# Patient Record
Sex: MALE | Race: WHITE | Employment: FULL TIME | URBAN - METROPOLITAN AREA
[De-identification: names, ages, dates, MRNs, and addresses within clinical notes are randomized per-mention and may not be internally consistent; named-entity substitution may affect disease eponyms.]

---

## 2017-05-25 ENCOUNTER — HOSPITAL ENCOUNTER (OUTPATIENT)
Dept: GENERAL RADIOLOGY | Facility: MEDICAL CENTER | Age: 47
Discharge: HOME | End: 2017-05-25
Attending: ORTHOPAEDIC SURGERY | Admitting: ORTHOPAEDIC SURGERY

## 2017-05-25 ENCOUNTER — OFFICE VISIT (OUTPATIENT)
Dept: ORTHOPEDIC SURGERY | Age: 47
End: 2017-05-25

## 2017-05-25 VITALS
SYSTOLIC BLOOD PRESSURE: 130 MMHG | HEIGHT: 71 IN | DIASTOLIC BLOOD PRESSURE: 87 MMHG | WEIGHT: 280 LBS | HEART RATE: 79 BPM | BODY MASS INDEX: 39.2 KG/M2

## 2017-05-25 DIAGNOSIS — M17.12 PRIMARY OSTEOARTHRITIS OF LEFT KNEE: ICD-10-CM

## 2017-05-25 DIAGNOSIS — M22.42 CHONDROMALACIA PATELLAE OF LEFT KNEE: ICD-10-CM

## 2017-05-25 DIAGNOSIS — S83.512S RUPTURE OF ANTERIOR CRUCIATE LIGAMENT OF LEFT KNEE, SEQUELA: ICD-10-CM

## 2017-05-25 DIAGNOSIS — M25.562 ACUTE PAIN OF LEFT KNEE: Primary | ICD-10-CM

## 2017-05-25 DIAGNOSIS — R20.0 NUMBNESS AND TINGLING OF LEFT LEG: ICD-10-CM

## 2017-05-25 DIAGNOSIS — S83.412A GRADE 1 INJURY OF MEDIAL COLLATERAL LIGAMENT OF LEFT KNEE: ICD-10-CM

## 2017-05-25 DIAGNOSIS — M25.562 ACUTE PAIN OF LEFT KNEE: ICD-10-CM

## 2017-05-25 DIAGNOSIS — R20.2 NUMBNESS AND TINGLING OF LEFT LEG: ICD-10-CM

## 2017-05-25 DIAGNOSIS — Z98.890 S/P LEFT KNEE ARTHROSCOPY: ICD-10-CM

## 2017-05-25 PROBLEM — S83.512A RUPTURE OF ANTERIOR CRUCIATE LIGAMENT OF LEFT KNEE: Status: ACTIVE | Noted: 2017-05-25

## 2017-05-25 PROCEDURE — 73562 X-RAY EXAM OF KNEE 3: CPT | Performed by: ORTHOPAEDIC SURGERY

## 2017-05-25 PROCEDURE — 99214 OFFICE O/P EST MOD 30 MIN: CPT | Performed by: ORTHOPAEDIC SURGERY

## 2017-05-25 RX ORDER — NAPROXEN 500 MG/1
500 TABLET ORAL 2 TIMES DAILY
Qty: 60 TABLET | Refills: 0 | Status: SHIPPED | OUTPATIENT
Start: 2017-05-25 | End: 2017-07-28 | Stop reason: SDUPTHER

## 2017-05-25 RX ORDER — METHYLPREDNISOLONE 4 MG/1
TABLET ORAL
Qty: 1 KIT | Refills: 0 | Status: SHIPPED | OUTPATIENT
Start: 2017-05-25 | End: 2017-06-16

## 2017-06-16 ENCOUNTER — OFFICE VISIT (OUTPATIENT)
Dept: ORTHOPEDIC SURGERY | Age: 47
End: 2017-06-16

## 2017-06-16 VITALS
HEART RATE: 106 BPM | DIASTOLIC BLOOD PRESSURE: 88 MMHG | WEIGHT: 279.98 LBS | HEIGHT: 71 IN | SYSTOLIC BLOOD PRESSURE: 148 MMHG | BODY MASS INDEX: 39.2 KG/M2

## 2017-06-16 DIAGNOSIS — S83.512S RUPTURE OF ANTERIOR CRUCIATE LIGAMENT OF LEFT KNEE, SEQUELA: ICD-10-CM

## 2017-06-16 DIAGNOSIS — Z98.890 S/P LEFT KNEE ARTHROSCOPY: ICD-10-CM

## 2017-06-16 DIAGNOSIS — M17.12 PRIMARY OSTEOARTHRITIS OF LEFT KNEE: ICD-10-CM

## 2017-06-16 DIAGNOSIS — M25.562 ACUTE PAIN OF LEFT KNEE: Primary | ICD-10-CM

## 2017-06-16 DIAGNOSIS — M22.42 CHONDROMALACIA PATELLAE OF LEFT KNEE: ICD-10-CM

## 2017-06-16 PROBLEM — R20.0 NUMBNESS AND TINGLING OF LEFT LEG: Status: ACTIVE | Noted: 2017-06-16

## 2017-06-16 PROBLEM — S83.512A RUPTURE OF ANTERIOR CRUCIATE LIGAMENT OF LEFT KNEE: Status: ACTIVE | Noted: 2017-06-16

## 2017-06-16 PROBLEM — R20.2 NUMBNESS AND TINGLING OF LEFT LEG: Status: ACTIVE | Noted: 2017-06-16

## 2017-06-16 PROCEDURE — 99214 OFFICE O/P EST MOD 30 MIN: CPT | Performed by: ORTHOPAEDIC SURGERY

## 2017-06-29 ENCOUNTER — TELEPHONE (OUTPATIENT)
Dept: ORTHOPEDIC SURGERY | Age: 47
End: 2017-06-29

## 2017-07-25 ENCOUNTER — HOSPITAL ENCOUNTER (OUTPATIENT)
Dept: NEUROLOGY | Age: 47
Discharge: HOME | End: 2017-07-25
Attending: ORTHOPAEDIC SURGERY | Admitting: ORTHOPAEDIC SURGERY

## 2017-07-25 DIAGNOSIS — R20.2 PARESTHESIA OF SKIN: ICD-10-CM

## 2017-07-28 ENCOUNTER — OFFICE VISIT (OUTPATIENT)
Dept: ORTHOPEDIC SURGERY | Age: 47
End: 2017-07-28

## 2017-07-28 VITALS
HEIGHT: 71 IN | WEIGHT: 280 LBS | DIASTOLIC BLOOD PRESSURE: 77 MMHG | HEART RATE: 75 BPM | SYSTOLIC BLOOD PRESSURE: 125 MMHG | BODY MASS INDEX: 39.2 KG/M2

## 2017-07-28 DIAGNOSIS — R20.2 NUMBNESS AND TINGLING OF LEFT LEG: ICD-10-CM

## 2017-07-28 DIAGNOSIS — S83.412A GRADE 1 INJURY OF MEDIAL COLLATERAL LIGAMENT OF LEFT KNEE: ICD-10-CM

## 2017-07-28 DIAGNOSIS — M22.42 CHONDROMALACIA PATELLAE OF LEFT KNEE: ICD-10-CM

## 2017-07-28 DIAGNOSIS — R20.0 NUMBNESS AND TINGLING OF LEFT LEG: ICD-10-CM

## 2017-07-28 DIAGNOSIS — M25.562 ACUTE PAIN OF LEFT KNEE: Primary | ICD-10-CM

## 2017-07-28 DIAGNOSIS — G57.32 PERONEAL MONONEUROPATHY, LEFT: ICD-10-CM

## 2017-07-28 DIAGNOSIS — M17.12 PRIMARY OSTEOARTHRITIS OF LEFT KNEE: ICD-10-CM

## 2017-07-28 PROBLEM — G57.30 PERONEAL MONONEUROPATHY: Status: ACTIVE | Noted: 2017-07-28

## 2017-07-28 PROCEDURE — 99213 OFFICE O/P EST LOW 20 MIN: CPT | Performed by: ORTHOPAEDIC SURGERY

## 2017-07-28 RX ORDER — NAPROXEN 500 MG/1
500 TABLET ORAL 2 TIMES DAILY
Qty: 60 TABLET | Refills: 0 | Status: SHIPPED | OUTPATIENT
Start: 2017-07-28 | End: 2017-11-27 | Stop reason: SDUPTHER

## 2017-07-28 RX ORDER — METHYLPREDNISOLONE 4 MG/1
TABLET ORAL
Qty: 1 KIT | Refills: 0 | Status: SHIPPED | OUTPATIENT
Start: 2017-07-28 | End: 2017-09-18

## 2017-09-18 ENCOUNTER — OFFICE VISIT (OUTPATIENT)
Dept: ORTHOPEDIC SURGERY | Age: 47
End: 2017-09-18

## 2017-09-18 VITALS
WEIGHT: 280 LBS | BODY MASS INDEX: 39.2 KG/M2 | SYSTOLIC BLOOD PRESSURE: 148 MMHG | HEIGHT: 71 IN | DIASTOLIC BLOOD PRESSURE: 96 MMHG | HEART RATE: 96 BPM

## 2017-09-18 DIAGNOSIS — M25.562 CHRONIC PAIN OF LEFT KNEE: Primary | ICD-10-CM

## 2017-09-18 DIAGNOSIS — G57.32 PERONEAL MONONEUROPATHY, LEFT: ICD-10-CM

## 2017-09-18 DIAGNOSIS — G89.29 CHRONIC PAIN OF LEFT KNEE: Primary | ICD-10-CM

## 2017-09-18 DIAGNOSIS — R20.0 NUMBNESS AND TINGLING OF LEFT LEG: ICD-10-CM

## 2017-09-18 DIAGNOSIS — M17.12 PRIMARY OSTEOARTHRITIS OF LEFT KNEE: ICD-10-CM

## 2017-09-18 DIAGNOSIS — M22.42 CHONDROMALACIA PATELLAE OF LEFT KNEE: ICD-10-CM

## 2017-09-18 DIAGNOSIS — R20.2 NUMBNESS AND TINGLING OF LEFT LEG: ICD-10-CM

## 2017-09-18 DIAGNOSIS — S83.412A GRADE 1 INJURY OF MEDIAL COLLATERAL LIGAMENT OF LEFT KNEE: ICD-10-CM

## 2017-09-18 PROCEDURE — 99213 OFFICE O/P EST LOW 20 MIN: CPT | Performed by: ORTHOPAEDIC SURGERY

## 2017-09-18 PROCEDURE — 20610 DRAIN/INJ JOINT/BURSA W/O US: CPT | Performed by: ORTHOPAEDIC SURGERY

## 2017-09-21 DIAGNOSIS — S83.412A GRADE 1 INJURY OF MEDIAL COLLATERAL LIGAMENT OF LEFT KNEE: ICD-10-CM

## 2017-09-21 DIAGNOSIS — G89.29 CHRONIC PAIN OF LEFT KNEE: ICD-10-CM

## 2017-09-21 DIAGNOSIS — M17.12 PRIMARY OSTEOARTHRITIS OF LEFT KNEE: ICD-10-CM

## 2017-09-21 DIAGNOSIS — M25.562 CHRONIC PAIN OF LEFT KNEE: ICD-10-CM

## 2017-09-21 PROCEDURE — L1845 KO DOUBLE UPRIGHT PRE CST: HCPCS | Performed by: ORTHOPAEDIC SURGERY

## 2017-10-04 ENCOUNTER — TELEPHONE (OUTPATIENT)
Dept: ORTHOPEDIC SURGERY | Age: 47
End: 2017-10-04

## 2017-10-10 ENCOUNTER — OFFICE VISIT (OUTPATIENT)
Dept: ORTHOPEDIC SURGERY | Age: 47
End: 2017-10-10

## 2017-10-10 VITALS
HEIGHT: 71 IN | SYSTOLIC BLOOD PRESSURE: 138 MMHG | BODY MASS INDEX: 39.2 KG/M2 | WEIGHT: 280 LBS | HEART RATE: 76 BPM | DIASTOLIC BLOOD PRESSURE: 77 MMHG

## 2017-10-10 DIAGNOSIS — R20.0 NUMBNESS AND TINGLING OF LEFT LEG: ICD-10-CM

## 2017-10-10 DIAGNOSIS — M17.12 PRIMARY OSTEOARTHRITIS OF LEFT KNEE: Primary | ICD-10-CM

## 2017-10-10 DIAGNOSIS — M22.42 CHONDROMALACIA PATELLAE OF LEFT KNEE: ICD-10-CM

## 2017-10-10 DIAGNOSIS — G89.29 CHRONIC PAIN OF LEFT KNEE: ICD-10-CM

## 2017-10-10 DIAGNOSIS — M25.562 CHRONIC PAIN OF LEFT KNEE: ICD-10-CM

## 2017-10-10 DIAGNOSIS — S83.412A GRADE 1 INJURY OF MEDIAL COLLATERAL LIGAMENT OF LEFT KNEE: ICD-10-CM

## 2017-10-10 DIAGNOSIS — G57.32 PERONEAL MONONEUROPATHY, LEFT: ICD-10-CM

## 2017-10-10 DIAGNOSIS — R20.2 NUMBNESS AND TINGLING OF LEFT LEG: ICD-10-CM

## 2017-10-10 PROCEDURE — 20610 DRAIN/INJ JOINT/BURSA W/O US: CPT | Performed by: ORTHOPAEDIC SURGERY

## 2017-10-10 PROCEDURE — 99999 PR OFFICE/OUTPT VISIT,PROCEDURE ONLY: CPT | Performed by: ORTHOPAEDIC SURGERY

## 2017-10-10 NOTE — PROGRESS NOTES
Pain: Persistent  Frequency of Pain: Several times daily  Date Pain First Started: 05/08/17  Aggravating Factors: Bending, Stretching, Straightening, Exercise, Kneeling, Squatting, Standing, Walking, Stairs  Limiting Behavior: Yes  Relieving Factors: Rest, Ice  Result of Injury: Yes  Work-Related Injury: Yes  Are there other pain locations you wish to document?: No    Patient Active Problem List   Diagnosis    Chondromalacia patellae of left knee    Chronic Anterior cruciate ligament tear left knee    S/P left knee arthroscopy, chondroplasty, synovectomy, partial medial and lateral meniscectomy 12/17/14    Primary osteoarthritis of left knee    Numbness and tingling of left leg    Acute pain of left knee    Rupture of anterior cruciate ligament of left knee    Grade 1 injury of medial collateral ligament of left knee    Peroneal mononeuropathy    Chronic pain of left knee     Past Medical History:   Diagnosis Date    Arthritis     left knee     Past Surgical History:   Procedure Laterality Date    KNEE ARTHROSCOPY Right 2006    arthroscopy    KNEE SURGERY  12/17/2014    LEFT KNEE ARTHROSCOPY, CHONDROPLASTY, SYNOVECTOMY, PARTIAL       Allergies:  Review of patient's allergies indicates no known allergies. Medications:  Prior to Visit Medications    Medication Sig Taking? Authorizing Provider   naproxen (NAPROSYN) 500 MG tablet Take 1 tablet by mouth 2 times daily  Crista Orellana MD   diclofenac sodium (VOLTAREN) 1 % GEL Apply 4 g topically 4 times daily as needed for Pain Apply 4 gram 4 times a day  Crista Orellana MD     Social History     Social History    Marital status: Single     Spouse name: N/A    Number of children: N/A    Years of education: N/A     Occupational History    Not on file.      Social History Main Topics    Smoking status: Current Every Day Smoker     Packs/day: 1.00     Years: 30.00    Smokeless tobacco: Current User    Alcohol use 7.2 oz/week     12 Cans of beer per none  [] mild  [] moderate  [] severe  [x] Scar / Surgical incision(s): [x] A-Scope Portals  [] Open Surgical Incision(s)    Alignment:  [x] Normal  [] Varus [] Valgus    Range of Motion:  [] Normal Knee ROM         [] Deferred: acute injury/post-surgery/pain   [x] Limited ROM:     Palpation:   [] No Tenderness  [x] Tenderness: Anterior [x] mild  [] moderate  [] severe   [x] Patellofemoral Crepitation:  [] none  [x] mild  [] moderate  [] severe     Motor Function:   [x] No gross motor weakness  [] Motor weakness:  [] mild  [] moderate  [] severe     Neurologic:  [x] Sensation to light touch intact   [x] Coordination / proprioception intact    Circulation:  [x] The limb is warm and well perfused  [x] Capillary refill is intact. [x] Edema  [x] none  [] mild  [] moderate  [] severe  [x] Venous stasis changes  [x] none  [] mild  [] moderate  [] severe    Contralateral Knee:  [x] No pain with ROM    Bilateral Hip Exam:  [x] Negative logroll    Data Reviewed:     No imaging studies were obtained today. XRays:  (3 views: AP, lateral and patella views) of his left knee taken on 5/25/17 showed moderate degenerative changes in the patellofemoral compartment, moderate-severe degenerative changes in the medial compartment and severe degenerative changes in the lateral compartment.  There is joint space narrowing and osteophyte formation in all 3 compartments.  There is neutral alignment.        MRI Results: The MRI of his left knee dated 6/6/17 shows:  ACL tear. Medial meniscal tear as above. Abnormal lateral meniscus as above, most likely postoperative, although superimposed acute tear is not completely excluded. Moderate sized knee joint effusion with numerous small filling defects in the fluid suggestive of intra-articular bodies. Degenerative changes and other findings as above.       The MRI of his left knee dated 11/26/14 shows a chronic anterior cruciate ligament tear.  Areas of grade 4 chondromalacia with osteochondral erosion at the lateral trochlear and posterior nonweightbearing lateral femoral condyle. A small oblique tear of the posterior horn and root of the lateral meniscus. Areas of grade 3 and grade 4 chondromalacia at the trochlear.     EMG Report: left lower extremity dated 7/25/17 shows:  IMPRESSION:  1.  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 This neuropathy appears to have primarily axonal loss features with  above noted mild deep peroneal motor conduction block across the left  knee.  Subacute reinnervation appears to be present and muscle  supplied by left deep peroneal branch. 2.  No evidence of a left lumbosacral radiculopathy, diffuse  peripheral neuropathy or other focal mononeuropathy involving the left  lower extremity. PROCEDURE NOTE: LEFT KNEE SYNVISC ONE INJECTION  10/10/2017 at 4:56 PM   Procedure: Synvisc One Injection (6 ml)  Verbal consent was obtained. Risks and benefits were explained. Questions were encouraged and answered. Timeout Verification Completed including:    Correct patient: Brigitte Martin was identified    Correct procedure    Correct site & side    Correct equipment and supplies    Staff member: Tico Bar MD     Assistant: Pedro Yang     The injection site (superolateral) was prepped with Chlora-Prep using aseptic technique and a left knee intra-articular knee injection was performed with ethyl chloride & 1% lidocaine (2 ml) for anesthetic. SYNVISC ONE (6 ml) was injected. A sterile adhesive dressing was applied. Post procedure: Brigitte Martin tolerated the treatment well. Instructions to patient:  Appropriate post injections instructions were given to Brigitte Martin. Assessment (Medical Decision Making): Brigitte Martin is a 52y.o. year old male with the following diagnosis:    1.  Primary osteoarthritis of left knee  [place] Education Materials Provided:    Patient Instructions     Roxie Oneil was instructed to apply ice to the injection area for 15 - 20 minutes several times a day to decrease pain and and swelling. Ice (\"ICE IS YOUR FRIEND\": try using a bag of frozen peas or corn) for 15  20 minutes 3 x day. Limit activities today. You may resume normal activities tomorrow if you have no pain. For severe pain:  If after hours, he is to go to Emergency Room. During office hours he must come in to the office. Roxie Oneil was instructed to call the office if there are any questions or concerns related to his condition. I have asked him to schedule a follow-up appointment for 6-8 weeks from now for re-evaluation and possible repeat injection. He is specifically instructed to contact the office between now & his scheduled appointment if he has concerns related to his condition. He is welcome to call for an appointment sooner if he has any additional concerns or questions. General Medication Instructions:  Any prescriptions must be used as directed. If you have any concerns, questions or require refills, please contact our office. If you experience any adverse reactions, stop the medication and call our office immediately. If you designate a preferred pharmacy, appropriate prescriptions will be sent to your preferred pharmacy for pickup to be use as directed. Patient Driving Instructions:  No driving if you are taking narcotic pain medications or muscle relaxers. PATIENT REMINDER:   Carry a list of your medications and allergies with you at all times. Call your pharmacy and our office at least 1 week in advance to refill prescriptions. Narcotic medications will not be refilled after hours or on weekends. ATTENTION    As of October 2, 2014, the [facility] ([address] [place] [address]) has mandated that ALL PRESCRIPTION PAIN MEDICINE cannot be called into your pharmacy.     This includes:    Oxycodone (Percocet)  Hydrocodone (Vicodin, Norco)  Tramadol (Ultram)  Other    These medications must have prescriptions which are written and signed by your doctor (Dr. Dina Jaime). This means that you must call ahead and come in to the office to  the paper prescription and take it to your pharmacy. We are sorry for any inconvenience but this is now the law. Lucas Barth MD  Board Certified Orthopaedic Surgeon  Knee and Shoulder Surgery Specialist    Contact Information:  Lesly Cardoza,   [phone], [address]         IMPORTANT NARCOTIC INFORMATION: PLEASE READ     [x] DO NOT share your prescription medication with anyone! Sharing is illegal.  The prescription dose is based on your age, weight, and health problems. Sharing your narcotic prescription can lead to accidental death of the individual for which the prescription was not prescribed. You may not know about his/her addiction problem. [x] Always use the same pharmacy when filling your narcotic prescriptions. [x] DO NOT mix your narcotic prescription with alcohol. Mixing the narcotic prescription medication with alcohol causes depressive effects including breathing problems, organ malfunction, and cardiac arrest.     [x] Always keep your narcotic medication in a locked, secured location. Keep your medication private. This is to avoid individuals from taking your medication without your knowledge. This medication is highly sought after and locking your prescriptions will protect you from being a target of crime. [x] DO NOT stock pile your medication. This also will protect you from being a target of crime. [x] Dispose of any unused medications properly. Do not flush the medications. Look for appropriate waste collection programs in your community and drug take back events. [x] DO NOT drive while taking narcotic pain medication or muscle relaxers.          FOR MORE INFORMATION, CHECK at 4:56 PM     Contact Information:  Aubrie Youngblood,   [phone], [address]    This dictation was performed with a verbal recognition program (DRAGON) and it was checked for errors. It is possible that there are still dictated errors within this office note. If so, please bring any errors to my attention for an addendum. All efforts were made to ensure that this office note is accurate. I have personally performed and/or participated in the history, physical exam and medical decision making and reviewed all pertinent clinical information unless otherwise noted.

## 2017-10-10 NOTE — LETTER
FAXED/SENT TO Dr Escobedo primary care provider on file. 10/10/17, 5:01 PM        Chandrika Mcneal MD  Orthopaedic Surgery & Sports Medicine  Knee & Shoulder Specialist      Dear Dr Escobedo primary care provider on file.,    Thank you very much for your referral of Mr. Roxie Oneil to me for evaluation and treatment. Attached below is my report and recommendations from Adair Trotter most recent office visit. I appreciate your confidence in me and thank you for allowing me the opportunity to care for your patients. If I can be of any further assistance to you on this or any other patient, please do not hesitate to contact me. Sincerely,    Chandrika Mcneal MD  Board Certified Orthopaedic Surgeon  Knee and Shoulder Surgery Specialist  Electronically signed by Chandrika Mcneal MD on 10/10/2017 at 5:01 PM     Contact Information:  Grace Burk,   [phone], [address]                             I have personally performed and/or participated in the history, physical exam and medical decision making and reviewed all pertinent clinical information unless otherwise noted. Chandrika Mcneal MD  Orthopaedic Surgery & Sports Medicine  Knee & Shoulder Specialist       [facility] OFFICE    [facility] OFFICE  [address] [place] [address], [address] Floor  [address] [address], [address] [place] [address] [facility]    [phone] [address]   [phone] [address]  [phone] (fax)    [phone] (fax)       PATIENT: Roxie Oneil    52 y.o.  male  YOB: 1970   MRN:  [id number]       Date of current encounter: 10/10/2017  This encounter is evaluated as a:        New Patient Visit     Established Patient Visit    Post-Op Visit      Consult: requested by          Worker's Comp       Patient's PCP is Dr. Sravan Brian primary care provider on file.      SURGICAL FINDINGS: S/P left knee arthroscopy, chondroplasty, synovectomy, partial medial and lateral meniscectomy 12/17/14         Subjective: Reason for Visit: left knee injection: Synvisc One    Chief Complaint   Patient presents with    Knee Pain     ongoing evaluation and treatment of left knee        HPI:  Daksha Arellano is a 52y.o. year old,  male complaining of left knee pain. He states that his left knee pain as a 10 out of 10 pain with activity and a 2 out of 10 pain at rest.  He describes his pain as sharp, grinding and popping. His left knee pain is persistent and occurs several times daily. Bending, stretching, straightening, exercise, kneeling, squatting, standing, walking, stairs and lifting aggravate his left knee. Left knee pain is limiting his behavior. Rest and ice to help. He does have night pain. He does have morning stiffness. He does have numbness. This is a Workmen's Compensation case. He has been approved to see Dr. Isra Riggs for the peroneal nerve injury. He has been approved for Synvisc 1 injection into his left knee.     PAIN ASSESSMENT:   Pain Assessment  Location of Pain: Knee  Location Modifiers: Left  Severity of Pain: 10  Quality of Pain: Sharp, Grinding, Popping  Duration of Pain: Persistent  Frequency of Pain: Several times daily  Date Pain First Started: 05/08/17  Aggravating Factors: Bending, Stretching, Straightening, Exercise, Kneeling, Squatting, Standing, Walking, Stairs  Limiting Behavior: Yes  Relieving Factors: Rest, Ice  Result of Injury: Yes  Work-Related Injury: Yes  Are there other pain locations you wish to document?: No    Patient Active Problem List   Diagnosis    Chondromalacia patellae of left knee    Chronic Anterior cruciate ligament tear left knee    S/P left knee arthroscopy, chondroplasty, synovectomy, partial medial and lateral meniscectomy 12/17/14    Primary osteoarthritis of left knee    Numbness and tingling of left leg    Acute pain of left knee    Rupture of anterior cruciate ligament of left knee    Grade 1 injury of medial collateral ligament of left knee  Peroneal mononeuropathy    Chronic pain of left knee     Past Medical History:   Diagnosis Date    Arthritis     left knee     Past Surgical History:   Procedure Laterality Date    KNEE ARTHROSCOPY Right 2006    arthroscopy    KNEE SURGERY  12/17/2014    LEFT KNEE ARTHROSCOPY, CHONDROPLASTY, SYNOVECTOMY, PARTIAL       Allergies:  Review of patient's allergies indicates no known allergies. Medications:  Prior to Visit Medications    Medication Sig Taking? Authorizing Provider   naproxen (NAPROSYN) 500 MG tablet Take 1 tablet by mouth 2 times daily  Elham Zimmer MD   diclofenac sodium (VOLTAREN) 1 % GEL Apply 4 g topically 4 times daily as needed for Pain Apply 4 gram 4 times a day  Elham Zimmer MD     Social History     Social History    Marital status: Single     Spouse name: N/A    Number of children: N/A    Years of education: N/A     Occupational History    Not on file. Social History Main Topics    Smoking status: Current Every Day Smoker     Packs/day: 1.00     Years: 30.00    Smokeless tobacco: Current User    Alcohol use 7.2 oz/week     12 Cans of beer per week      Comment: NONE FOR PAST MONTH    Drug use: No    Sexual activity: Not on file     Other Topics Concern    Not on file     Social History Narrative    No narrative on file     No family history on file. Review of Systems (ROS):    Performed. Kathie Hatfield's review of systems has been performed (Musculoskeletal, Integumentary, CardioPulmonary, Neurological focused) by intake and observation. All past and current ROS forms have been scanned into the medical record. He has been instructed to contact his primary care physician regarding ROS issues if not already being addressed at this time. There are no recent changes. The most recent ROS was scanned into media on 5/25/2017.     Objective:   Physical Exam  Vital Signs:  /77   Pulse 76   Ht 5' 11\" (1.803 m)   Wt 280 lb (127 kg)   BMI 39.05 kg/m² (3 views: AP, lateral and patella views) of his left knee taken on 5/25/17 showed moderate degenerative changes in the patellofemoral compartment, moderate-severe degenerative changes in the medial compartment and severe degenerative changes in the lateral compartment.  There is joint space narrowing and osteophyte formation in all 3 compartments.  There is neutral alignment.        MRI Results: The MRI of his left knee dated 6/6/17 shows:  ACL tear. Medial meniscal tear as above. Abnormal lateral meniscus as above, most likely postoperative, although superimposed acute tear is not completely excluded. Moderate sized knee joint effusion with numerous small filling defects in the fluid suggestive of intra-articular bodies. Degenerative changes and other findings as above.       The MRI of his left knee dated 11/26/14 shows a chronic anterior cruciate ligament tear. Areas of grade 4 chondromalacia with osteochondral erosion at the lateral trochlear and posterior nonweightbearing lateral femoral condyle. A small oblique tear of the posterior horn and root of the lateral meniscus. Areas of grade 3 and grade 4 chondromalacia at the trochlear.     EMG Report: left lower extremity dated 7/25/17 shows:  IMPRESSION:  1.  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 This neuropathy appears to have primarily axonal loss features with  above noted mild deep peroneal motor conduction block across the left  knee.  Subacute reinnervation appears to be present and muscle  supplied by left deep peroneal branch. 2.  No evidence of a left lumbosacral radiculopathy, diffuse  peripheral neuropathy or other focal mononeuropathy involving the left  lower extremity.          PROCEDURE NOTE: LEFT KNEE SYNVISC ONE INJECTION  10/10/2017 at 4:56 PM   Procedure: Synvisc One Injection (6 ml) General Medication Instructions:  Any prescriptions must be used as directed. If you have any concerns, questions or require refills, please contact our office. If you experience any adverse reactions, stop the medication and call our office immediately. If you designate a preferred pharmacy, appropriate prescriptions will be sent to your preferred pharmacy for pickup to be use as directed. Patient Driving Instructions:  No driving if you are taking narcotic pain medications or muscle relaxers. PATIENT REMINDER:   Carry a list of your medications and allergies with you at all times. Call your pharmacy and our office at least 1 week in advance to refill prescriptions. Narcotic medications will not be refilled after hours or on weekends. ATTENTION    As of October 2, 2014, the [facility] ([address]) has mandated that ALL PRESCRIPTION PAIN MEDICINE cannot be called into your pharmacy. This includes:    Oxycodone (Percocet)  Hydrocodone (Vicodin, Norco)  Tramadol (Ultram)  Other    These medications must have prescriptions which are written and signed by your doctor (Dr. Yancy Siddiqui). This means that you must call ahead and come in to the office to  the paper prescription and take it to your pharmacy. We are sorry for any inconvenience but this is now the law. Crista Orellana MD  Board Certified Orthopaedic Surgeon  Knee and Shoulder Surgery Specialist    Contact Information:  Steffanie Mariee,   [phone], [address]         IMPORTANT NARCOTIC INFORMATION: PLEASE READ      DO NOT share your prescription medication with anyone! Sharing is illegal.  The prescription dose is based on your age, weight, and health problems. Sharing your narcotic prescription can lead to accidental death of the individual for which the prescription was not prescribed. You may not know about his/her addiction problem. Always use the same pharmacy when filling your narcotic prescriptions. DO NOT mix your narcotic prescription with alcohol. Mixing the narcotic prescription medication with alcohol causes depressive effects including breathing problems, organ malfunction, and cardiac arrest.      Always keep your narcotic medication in a locked, secured location. Keep your medication private. This is to avoid individuals from taking your medication without your knowledge. This medication is highly sought after and locking your prescriptions will protect you from being a target of crime. DO NOT stock pile your medication. This also will protect you from being a target of crime. Dispose of any unused medications properly. Do not flush the medications. Look for appropriate waste collection programs in your community and drug take back events. DO NOT drive while taking narcotic pain medication or muscle relaxers. FOR MORE INFORMATION, CHECK OUT THIS RESOURCE    For your convenience, Dr. Cherry Lai has provided the following link that may be helpful for you if you would like more information on your Orthopaedic condition:    www. Orthoinfo. org         If you or someone you know struggles with weight issues and joint pain, please check out this important documentary:      \"Start Moving Start Living\" =  Http://[employer].IMRSV       (OneTouch video SMSL FULL SD)                VITAMIN D3    Dr Cherry Lai recommends that you add an over-the-counter supplement of vitamin D3, (at least 2,000 IU daily). Vitamin D3 is widely available without a prescription at pharmacies and buying clubs ([facility], [facility]) and on-line at sites such as Amazon.com. It comes in a variety of formulations (tablets, gelcaps, liquid) and doses (1,000 IU, 2,000 IU, 4,000 IU, 5,000 IU and even higher). The right dose for most people is 2,000 IU per day but higher doses are sometimes needed.

## 2017-10-10 NOTE — PATIENT INSTRUCTIONS
Norco)  Tramadol (Ultram)  Other    These medications must have prescriptions which are written and signed by your doctor (Dr. Sonia Silveira). This means that you must call ahead and come in to the office to  the paper prescription and take it to your pharmacy. We are sorry for any inconvenience but this is now the law. Charis Olvera MD  Board Certified Orthopaedic Surgeon  Knee and Shoulder Surgery Specialist    Contact Information:  Malik [address],   [phone], [address]         IMPORTANT NARCOTIC INFORMATION: PLEASE READ     [x] DO NOT share your prescription medication with anyone! Sharing is illegal.  The prescription dose is based on your age, weight, and health problems. Sharing your narcotic prescription can lead to accidental death of the individual for which the prescription was not prescribed. You may not know about his/her addiction problem. [x] Always use the same pharmacy when filling your narcotic prescriptions. [x] DO NOT mix your narcotic prescription with alcohol. Mixing the narcotic prescription medication with alcohol causes depressive effects including breathing problems, organ malfunction, and cardiac arrest.     [x] Always keep your narcotic medication in a locked, secured location. Keep your medication private. This is to avoid individuals from taking your medication without your knowledge. This medication is highly sought after and locking your prescriptions will protect you from being a target of crime. [x] DO NOT stock pile your medication. This also will protect you from being a target of crime. [x] Dispose of any unused medications properly. Do not flush the medications. Look for appropriate waste collection programs in your community and drug take back events. [x] DO NOT drive while taking narcotic pain medication or muscle relaxers.          FOR MORE INFORMATION, CHECK OUT THIS RESOURCE    For your convenience, Dr. Sonia Silveira has provided the following link that may be helpful for you if you would like more information on your Orthopaedic condition:    www. Orthoinfo. org         If you or someone you know struggles with weight issues and joint pain, please check out this important documentary:      \"Start Moving Start Living\" =  Http://startmovingstartliving.Dexmo       (YOUTUBE video SMSL FULL SD)                VITAMIN D3    Dr Dina Jaime recommends that you add an over-the-counter supplement of vitamin D3, (at least 2,000 IU daily). Vitamin D3 is widely available without a prescription at pharmacies and buying clubs ([employer], [employer]) and on-line at sites such as Amazon.com. It comes in a variety of formulations (tablets, gelcaps, liquid) and doses (1,000 IU, 2,000 IU, 4,000 IU, 5,000 IU and even higher). The right dose for most people is 2,000 IU per day but higher doses are sometimes needed. We have not seen any problems with any of the formulations so we have no reason to recommend a specific brand. You can take your vitamin D3 at any time of the day, with or without food, with or without calcium. Because vitamin D3 is long acting, if you miss your vitamin D3 on one day you can double up the dose on a later day.

## 2017-10-17 ENCOUNTER — OFFICE VISIT (OUTPATIENT)
Dept: ORTHOPEDIC SURGERY | Age: 47
End: 2017-10-17

## 2017-10-17 VITALS
DIASTOLIC BLOOD PRESSURE: 84 MMHG | BODY MASS INDEX: 39.2 KG/M2 | HEIGHT: 71 IN | HEART RATE: 90 BPM | SYSTOLIC BLOOD PRESSURE: 144 MMHG | WEIGHT: 279.98 LBS

## 2017-10-17 DIAGNOSIS — M25.562 LEFT KNEE PAIN, UNSPECIFIED CHRONICITY: Primary | ICD-10-CM

## 2017-10-17 PROCEDURE — 99244 OFF/OP CNSLTJ NEW/EST MOD 40: CPT | Performed by: ORTHOPAEDIC SURGERY

## 2017-10-17 RX ORDER — IBUPROFEN 200 MG
200 TABLET ORAL EVERY 6 HOURS PRN
COMMUNITY
End: 2017-11-27 | Stop reason: ALTCHOICE

## 2017-10-17 NOTE — PROGRESS NOTES
patella. Review of Systems:  A 14 point review of systems was completed by the patient on 10/17/2017 and is available in the media section of the scanned medical record and was reviewed on 10/17/2017. The review is negative with the exception of those things mentioned in the HPI and Past Medical History. Past History:  Past Medical History:   Diagnosis Date    Arthritis     left knee     Past Surgical History:   Procedure Laterality Date    KNEE ARTHROSCOPY Right 2006    arthroscopy    KNEE SURGERY  12/17/2014    LEFT KNEE ARTHROSCOPY, CHONDROPLASTY, SYNOVECTOMY, PARTIAL     Current Outpatient Prescriptions on File Prior to Visit   Medication Sig Dispense Refill    naproxen (NAPROSYN) 500 MG tablet Take 1 tablet by mouth 2 times daily 60 tablet 0    diclofenac sodium (VOLTAREN) 1 % GEL Apply 4 g topically 4 times daily as needed for Pain Apply 4 gram 4 times a day 500 g 2     No current facility-administered medications on file prior to visit. Social History     Social History    Marital status: Single     Spouse name: N/A    Number of children: N/A    Years of education: N/A     Occupational History    Not on file. Social History Main Topics    Smoking status: Current Every Day Smoker     Packs/day: 1.00     Years: 30.00    Smokeless tobacco: Current User    Alcohol use 7.2 oz/week     12 Cans of beer per week      Comment: NONE FOR PAST MONTH    Drug use: No    Sexual activity: Not on file     Other Topics Concern    Not on file     Social History Narrative    No narrative on file     History reviewed. No pertinent family history.     Current Medications:    Current Outpatient Prescriptions   Medication Sig Dispense Refill    ibuprofen (ADVIL;MOTRIN) 200 MG tablet Take 200 mg by mouth every 6 hours as needed for Pain      naproxen (NAPROSYN) 500 MG tablet Take 1 tablet by mouth 2 times daily 60 tablet 0    diclofenac sodium (VOLTAREN) 1 % GEL Apply 4 g topically 4 times daily as needed for Pain Apply 4 gram 4 times a day 500 g 2     No current facility-administered medications for this visit. Allergies:  No Known Allergies    Physical Exam:  Vitals:    10/17/17 1413   BP: (!) 144/84   Pulse: 90     General: Berry Toscano is a healthy and well appearing 52y.o. year old male who is sitting comfortably in our office in no acute distress. Neuro: alert. oriented  Eyes: Extra-ocular muscles intact  Mouth: Oral mucosa moist. No perioral lesions  Pulm: Respirations unlabored and regular. Heart: Regular rate and rhythm   Skin: warm, well perfused    Knee Examination:   He also has IT band tenderness over the lateral side of the left knee.        RIGHT     LEFT  General:   EFFUSION:     [x] NL(4) [] Mild(3) [] Mod(2) [] Sev(1)   [x] NL(4) [] Mild(3) [] Mod(2) [] Sev(1)    TOTAL FLEX:  [x] NL(4) [] Mild(3) [] Mod(2) [] Sev(1)   [x] NL(4) [] Mild(3) [] Mod(2) [] Sev(1)    LACK of EXT:  [x] NL(4) [] Mild(3) [] Mod(2) [] Sev(1)   [] NL(4) [x] Mild(3) [] Mod(2) [] Sev(1)    QUAD WEAK: [x] NL(4) [] Mild(3) [] Mod(2) [] Sev(1)   [] NL(4) [x] Mild(3) [] Mod(2) [] Sev(1)    Points (16)  R:       L:          Tibio Femoral:       RIGHT     LEFT  JT LINE PAIN:     [x] NL(4) [] Mild(3) [] Mod(2) [] Sev(1)   [] NL(4) [] Mild(3) [x] Mod(2) [] Sev(1)    CREPITUS:        [x] NL(4) [] Mild(3) [] Mod(2) [] Sev(1)   [x] NL(4) [] Mild(3) [] Mod(2) [] Sev(1)    COMP PAIN:       [x] NL(4) [] Mild(3) [] Mod(2) [] Sev(1)   [x] NL(4) [] Mild(3) [] Mod(2) [] Sev(1)    Points (12)  R:      L:      Patello Femoral Joint:        RIGHT     LEFT  CREPITUS:                 [x] NL(4) [] Mild(3) [] Mod(2) [] Sev(1)   [x] NL(4) [] Mild(3) [] Mod(2) [] Sev(1)    COMP PAIN:               [x] NL(4) [] Mild(3) [] Mod(2) [] Sev(1)   [x] NL(4) [] Mild(3) [] Mod(2) [] Sev(1)    SOFT TISSUE PAIN:  [x] NL(4) []Mild(3) []Mod(2) [] Sev(1)  Location:    [x] NL(4) [] Mild(3) [] Mod(2) [] Sev(1)   Location:    SOFT TISSUE Mild(3) [] Mod(2) [] Sev(1)     [] NL(4) [x] Mild(3) [] Mod(2) [] Sev(1)    Patellofemoral:                               [x] NL(4) [] Mild(3) [] Mod(2) [] Sev(1)  [] NL(4) [] Mild(3) [x] Mod(2) [] Sev(1)    Alignment:                                      [x] Normal          Degrees                WBL  [x] Normal                 Degrees                WBL   Point: (12)  R:      L:            Laboratory:  No visits with results within 14 Day(s) from this visit. Latest known visit with results is:   No results found for any previous visit. No results found for this or any previous visit (from the past 24 hour(s)). Radiographic:  Outside x-rays reviewed showing tricompartmental arthritis of the left knee. MRI of the left knee dated 6/6/2017 ( full MRI report can be seen in the medial section of the Epic chart)  Impression:  ACL tear  Medial meniscus tear  Abnormal lateral meniscus, most likely postoperative, although superimpose acute tear is not completely excluded. Moderate sized knee joint effusion with numerous small filling defects in the fluid suggestive of intra-articular bodies. Degenerative changes and other findings as above  Tricompartmental degenerative changes with thinning and irregularity of articular cartilage accompanied by osteophyte formation. EMG of the left lower extremity, exam date 7/25/2017  SUMMARY:  1.  Nerve conduction studies reveal borderline diminished amplitude of  left deep peroneal motor and superficial peroneal sensory evoked  responses. There is mild left deep peroneal motor conduction block  (drop in amplitude) of about 20% across the fibular head of the left  knee. Left deep peroneal motor nerve conduction velocities both above  and below the knee are within normal limits. 2.  Needle EMG reveals mildly enlarged, polyphasic, voluntary motor  unit potentials with slight decreased recruitment within muscles  supplied by left deep peroneal nerve.   No muscle membrane irritability  (sustained muscle denervation) is noted however.     IMPRESSION: Neural status  1. 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 This neuropathy appears to have primarily axonal loss features with  above noted mild deep peroneal motor conduction block across the left  knee. Subacute reinnervation appears to be present and muscle  supplied by left deep peroneal branch. 2.  No evidence of a left lumbosacral radiculopathy, diffuse  peripheral neuropathy or other focal mononeuropathy involving the left  lower extremity. IMPRESSION; Ortho left knee  1. It is my impression the left peroneal nerve injury as result of the recent work-related injury as the symptoms were not present prior to the injury and there is objective evidence to support the signs and symptoms detailed above  2. It is my impression within a reasonable degree of medical certainty that the knee symptoms and joint swelling. The patient is currently experiencing. Represents a traumatic aggravation of pre-existing osteoarthritis that is well detailed and documented in Dr. Ingrid Martel note and arthroscopic photographs which were reviewed. The patient had a very good functional result from the prior arthroscopic intervention and was nearly asymptomatic prior to the recent traumatic knee injury     Diagnosis/Plan  The patient is a 80-year-old gentleman with the following diagnosis:  1. Significant  aggravation of the pre-existing osteoarthritis secondary to a work-related injury. A comprehensive explanation was provided and the goal is to stay active and buy time to eventual surgical procedures that will be discussed at a future time based on the initial response to treatment.   Thus far he has undergone physical therapy, oral anti-inflammatories, modifying his activity levels, intra-articular cortisone injection as well as viscose examination and arrival at the treatment treatment program and explanation to the patient    3:34 PM      Daylin Barreto, [address] Physician Assistant    During this examination, Alycia Woods PA-C, functioned as a scribe for Dr. Monica Luo. This dictation was performed with a verbal recognition program (DRAGON) and it was checked for errors. It is possible that there are still dictated errors within this office note. If so, please bring any errors to my attention for an addendum. All efforts were made to ensure that this office note is accurate. Supervising Physician Attestation:  I, Dr. Monica Luo, personally performed the services described in this documentation as scribed above, and it is both accurate and complete and I agree with all pertinent clinical information. I personally interviewed the patient and performed a physical examination and medical decision making. I discussed the patient's condition and treatment options and have  reviewed and agree with the past medical, family and social history unless otherwise noted. All of the patient's questions were answered.       Board Certified Orthopaedic Surgeon  [address] [facility] and [address] [place] [address]  [facility] [employer]  Professor of [address]

## 2017-11-21 ENCOUNTER — TELEPHONE (OUTPATIENT)
Dept: ORTHOPEDIC SURGERY | Age: 47
End: 2017-11-21

## 2017-11-21 NOTE — TELEPHONE ENCOUNTER
I called the patient and his voice mailbox is not set up. I did find a name of a neurologist for him:  Dr. Breezy Dacosta with [address] [place] [address] [address] Neurology [phone]. He can call and see if he can get appointment with him.

## 2017-11-27 ENCOUNTER — OFFICE VISIT (OUTPATIENT)
Dept: ORTHOPEDIC SURGERY | Age: 47
End: 2017-11-27

## 2017-11-27 VITALS
BODY MASS INDEX: 39.2 KG/M2 | HEART RATE: 90 BPM | SYSTOLIC BLOOD PRESSURE: 170 MMHG | HEIGHT: 71 IN | WEIGHT: 280 LBS | DIASTOLIC BLOOD PRESSURE: 100 MMHG

## 2017-11-27 DIAGNOSIS — M25.562 CHRONIC PAIN OF LEFT KNEE: Primary | ICD-10-CM

## 2017-11-27 DIAGNOSIS — G89.29 CHRONIC PAIN OF LEFT KNEE: Primary | ICD-10-CM

## 2017-11-27 DIAGNOSIS — G57.32 PERONEAL MONONEUROPATHY, LEFT: ICD-10-CM

## 2017-11-27 DIAGNOSIS — S83.412A GRADE 1 INJURY OF MEDIAL COLLATERAL LIGAMENT OF LEFT KNEE: ICD-10-CM

## 2017-11-27 DIAGNOSIS — R20.2 NUMBNESS AND TINGLING OF LEFT LEG: ICD-10-CM

## 2017-11-27 DIAGNOSIS — M22.42 CHONDROMALACIA PATELLAE OF LEFT KNEE: ICD-10-CM

## 2017-11-27 DIAGNOSIS — R20.0 NUMBNESS AND TINGLING OF LEFT LEG: ICD-10-CM

## 2017-11-27 DIAGNOSIS — M17.12 PRIMARY OSTEOARTHRITIS OF LEFT KNEE: ICD-10-CM

## 2017-11-27 PROCEDURE — 99213 OFFICE O/P EST LOW 20 MIN: CPT | Performed by: PHYSICIAN ASSISTANT

## 2017-11-27 RX ORDER — NAPROXEN 500 MG/1
500 TABLET ORAL 2 TIMES DAILY
Qty: 60 TABLET | Refills: 0 | Status: SHIPPED | OUTPATIENT
Start: 2017-11-27

## 2017-11-27 NOTE — PATIENT INSTRUCTIONS
I have asked Juma Kang to schedule a follow-up appointment after we get approval for cortisone injection. Pita Isabelle He is specifically instructed to contact the office between now & his scheduled appointment if he has concerns related to his condition. He is welcome to call for an appointment sooner if he has any additional concerns or questions. Ice (\"ICE IS YOUR FRIEND\": try using a bag of frozen peas or corn) to injured/painful area for 15  20 minutes 3 x day. General Medication Instructions:  Any prescriptions must be used as directed. If you have any concerns, questions or require refills, please contact our office. If you experience any adverse reactions, stop the medication and call our office immediately. If you designate a preferred pharmacy, appropriate prescriptions will be sent to your preferred pharmacy for pickup to be use as directed. Patient Driving Instructions:  No driving if you are taking narcotic pain medications or muscle relaxers. PATIENT REMINDER:   Carry a list of your medications and allergies with you at all times. Call your pharmacy and our office at least 1 week in advance to refill prescriptions. Narcotic medications will not be refilled after hours or on weekends. ATTENTION    As of October 2, 2014, the [facility] ([facility]) has mandated that ALL PRESCRIPTION PAIN MEDICINE cannot be called into your pharmacy. This includes:    Oxycodone (Percocet)  Hydrocodone (Vicodin, Norco)  Tramadol (Ultram)  Other    These medications must have prescriptions which are written and signed by your provider. This means that you must call ahead and come in to the office to  the paper prescription and take it to your pharmacy. We are sorry for any inconvenience but this is now the law.     Jos Tan PA-C  Physician Assistant, Orthopedics    Contact Information:  Davonte Anderson,  & Clinical Staff  [phone], [address]

## 2017-11-27 NOTE — LETTER
FAXED/SENT TO Dr Escobeod primary care provider on file. 11/27/17, 4:13 PM        Sandro Syed      Dear Dr Escobedo primary care provider on file.,    Thank you very much for your referral of Mr. Jeffrey Larios to me for evaluation and treatment. Attached below is my report and recommendations from Luba Duke most recent office visit. I appreciate your confidence in me and thank you for allowing me the opportunity to care for your patients. If I can be of any further assistance to you on this or any other patient, please do not hesitate to contact me. Sincerely,    Sandro Berry PA-C  Electronically signed by Sandro Berry PA-C on 11/27/2017 at 4:13 PM     Contact Information:  Jeni Chiu,  &  Clinical Staff  [phone], Option [id number] [address] [facility]  Orthopaedic Surgery & Sports Medicine       [address] [facility] OFFICE  [address] [place] [address], [address] [place] [address]    [phone] Option 3   [phone] Option [phone] (fax)    [phone] (fax)       PATIENT: Jeffrey Larios    52 y.o.  male  YOB: 1970   MRN:  [id number]       Date of current encounter: 11/27/2017  This encounter is evaluated as a:        New Patient Visit     Established Patient Visit    Post-Op Visit      Consult: requested by          Worker's Comp       Patient's PCP is Dr. Lucila Bateman primary care provider on file. SURGICAL FINDINGS: S/P left knee arthroscopy, chondroplasty, synovectomy, partial medial and lateral meniscectomy 12/17/14            Subjective:     Chief Complaint   Patient presents with    Knee Pain     ongoing evaluation and treatment of left knee        HPI:  Jeffrey Larios is a 52y.o. year old,  male complaining of left knee pain.  Since his last visit he states that he continues to have stiffness in his left knee. He has seen  who has referred him to neurology for his peroneal mononeuropathy. He is in the process of finding a neurologist that will accept [facility]. He rates his pain as a 3 out of 10 at rest and a 5 out of 10 with activity. He describes his pain as throbbing, sharp and popping. It is persistent and constant. Bending, stretching, straight in, exercise, kneeling, squatting, standing, walking, stairs, reaching overhead and lifting aggravate his pain. Rest and ice help to relieve his pain. He does have night pain. He does have morning stiffness. PAIN ASSESSMENT:   Pain Assessment  Location of Pain: Knee  Location Modifiers: Left  Severity of Pain: 5  Quality of Pain: Throbbing, Sharp, Popping  Duration of Pain: Persistent  Frequency of Pain: Constant  Date Pain First Started: 05/08/17  Aggravating Factors: Other (Comment) (all)  Limiting Behavior: Yes  Relieving Factors: Rest, Ice  Result of Injury: Yes  Work-Related Injury: Yes  Are there other pain locations you wish to document?: No    Patient Active Problem List   Diagnosis    Chondromalacia patellae of left knee    Chronic Anterior cruciate ligament tear left knee    S/P left knee arthroscopy, chondroplasty, synovectomy, partial medial and lateral meniscectomy 12/17/14    Primary osteoarthritis of left knee    Numbness and tingling of left leg    Acute pain of left knee    Rupture of anterior cruciate ligament of left knee    Grade 1 injury of medial collateral ligament of left knee    Peroneal mononeuropathy, left    Chronic pain of left knee     Past Medical History:   Diagnosis Date    Arthritis     left knee     Past Surgical History:   Procedure Laterality Date    KNEE ARTHROSCOPY Right 2006    arthroscopy    KNEE SURGERY  12/17/2014    LEFT KNEE ARTHROSCOPY, CHONDROPLASTY, SYNOVECTOMY, PARTIAL       Allergies:  Review of patient's allergies indicates no known allergies.     Medications: Oriented to  person,  place, and  time. Mood appropriate for circumstances. Gait:   Gait is  Normal   Impaired slight limp and wearing knee brace  Assistive Device:  None   Knee Brace   Cane   Crutches    Walker    Wheelchair   Other     ORTHOPAEDIC KNEE EXAM: LEFT   Inspection:   Skin intact without abrasion or lacerations. Ecchymosis:   none   mild   moderate   severe   Atrophy:   none   mild   moderate   severe   Effusion:  none   mild   moderate   severe   Scar / Surgical incision(s): A-Scope Portals   Open Surgical Incision(s)    Alignment:   Normal   Varus  Valgus    Range of Motion:   Normal Knee ROM          Deferred: acute injury/post-surgery/pain    Limited ROM    Palpation:    No Tenderness   Tenderness: Anterior and lateral  mild   moderate   severe    Patellofemoral Crepitation:   none   mild   moderate   severe    Provocative Tests:    Negative: Meniscal testing, ligament stress testing, patellar apprehension  Positive Tests:   Meniscus Testing:    Medial Lianne's Test (MMT)    Lateral Lianne's Test (LMT)        Instability Testing:    Lachman (ACL)    Posterior Drawer (PCL)    Valgus Stress in Extension (MCL)    Valgus Stress in 30º of Flexion (MCL)    Varus Stress Test (LCL)        Patella Apprehension    General Ligament Laxity     Provocative Tests: BILATERAL HIP(S)   Negative: logroll    Motor Function:    No gross motor weakness   Motor weakness:   mild   moderate   severe     Neurologic:   Sensation to light touch intact    Coordination / proprioception intact    Circulation:   The limb is warm and well perfused   Capillary refill is intact.    Edema   none   mild   moderate   severe   Venous stasis changes   none   mild   moderate   severe    Contralateral Knee:   No pain with ROM     Data Reviewed:      No imaging studies were obtained today.     XRays:  (3 views: AP, lateral and patella views) of his left knee taken on 5/25/17 3. Grade 1 injury of medial collateral ligament of left knee     4. Chondromalacia patellae of left knee     5. Numbness and tingling of left leg     6. Peroneal mononeuropathy, left         His overall course:          Responding adequately to ongoing treatment      Worsening despite conservative treatment      Unchanged despite conservative treatment    Plan (Medical Decision Making):      I discussed the diagnosis and the treatment options with Brigitte Martin today. In Summary:  1). The various treatment options were outlined and discussed with Brigitte Martin including:       Conservative care options:      physical therapy, ice, NSAIDs, bracing, and activity modification        The indications for therapeutic injections Steroids and Hyluronic Acid/Synvisc/Euflexxa/Supartz    2). After considering the various options discussed, Brigitte Martin elected to pursue a course of treatment that includes the following:       MEDICATIONS/REFILLS prescribed today are listed below. Naproxen and Voltaren gel. We did discuss him not taking any other anti-inflammatories while taking the Naproxen. Physical Therapy/HEP Activities as tolerated. Continue HEP. Script: 2 x per week x 4 weeks     Ice to affected area: 15-20 minutes 4 x day     Over the Counter/Suppliment Tx     Drinking 6-8 oz of Tart Cherry Juice     Taking Glucosamine and Chondroitin Sulfate (1500 mg/d)     Vit D 3 (at least 2,000 IU/day)     He would like to try another steroid injection. We will need approval from [facility] before doing this. Return to Clinic/Follow - Up: Brigitte Martin was asked to make a follow-up appointment:      Once approval from [facility] for a steroid injection into his left knee is obtained. Brigitte Martin was instructed to call the office if his symptoms worsen or if new symptoms appear prior to the next scheduled visit.  He is specifically instructed to contact the office between now & his scheduled appointment if he has concerns related to his condition or if he needs assistance in scheduling the above tests. He is welcome to call for an appointment sooner if he has any additional concerns or questions. Patient Education Materials Provided:   Amina Chavarria PA-C:  Anatomic Drawings and treatment algorithms    Patient Instructions      I have asked Vanesa Aguero to schedule a follow-up appointment after we get approval for cortisone injection. Gita Alcazar He is specifically instructed to contact the office between now & his scheduled appointment if he has concerns related to his condition. He is welcome to call for an appointment sooner if he has any additional concerns or questions. Ice (\"ICE IS YOUR FRIEND\": try using a bag of frozen peas or corn) to injured/painful area for 15  20 minutes 3 x day. General Medication Instructions:  Any prescriptions must be used as directed. If you have any concerns, questions or require refills, please contact our office. If you experience any adverse reactions, stop the medication and call our office immediately. If you designate a preferred pharmacy, appropriate prescriptions will be sent to your preferred pharmacy for pickup to be use as directed. Patient Driving Instructions:  No driving if you are taking narcotic pain medications or muscle relaxers. PATIENT REMINDER:   Carry a list of your medications and allergies with you at all times. Call your pharmacy and our office at least 1 week in advance to refill prescriptions. Narcotic medications will not be refilled after hours or on weekends. ATTENTION    As of October 2, 2014, the [facility] [address] ([address]) has mandated that ALL PRESCRIPTION PAIN MEDICINE cannot be called into your pharmacy.     This includes:    Oxycodone (Percocet)  Hydrocodone (Vicodin, Norco)  Tramadol (Ultram)  Other    These medications must have prescriptions which are written and signed by If you or someone you know struggles with weight issues and joint pain, please check out this important documentary:      \"Start Moving Start Living\" =  Http://startmovingDomain [employer]       (YOUTUBE video SMSL FULL SD)                VITAMIN D3    Dr Anderson John recommends that you add an over-the-counter supplement of vitamin D3, (at least 2,000 IU daily). Vitamin D3 is widely available without a prescription at pharmacies and buying clubs ([facility], [facility]) and on-line at sites such as Amazon.com. It comes in a variety of formulations (tablets, gelcaps, liquid) and doses (1,000 IU, 2,000 IU, 4,000 IU, 5,000 IU and even higher). The right dose for most people is 2,000 IU per day but higher doses are sometimes needed. We have not seen any problems with any of the formulations so we have no reason to recommend a specific brand. You can take your vitamin D3 at any time of the day, with or without food, with or without calcium. Because vitamin D3 is long acting, if you miss your vitamin D3 on one day you can double up the dose on a later day. No orders of the defined types were placed in this encounter.       Refills/New Prescriptions:  Orders Placed This Encounter   Medications    naproxen (NAPROSYN) 500 MG tablet     Sig: Take 1 tablet by mouth 2 times daily     Dispense:  60 tablet     Refill:  0    diclofenac sodium (VOLTAREN) 1 % GEL     Sig: Apply 4 g topically 4 times daily as needed for Pain Apply 4 gram 4 times a day     Dispense:  500 g     Refill:  2     Today's prescription medications will be e-scribed (when appropriate) to the Patient's Preferred Pharmacy:   [facility] [address], [address] [facility] [address] [place] [address]  Phone: [phone] Fax: 3954 [facility] 1039 [facility], [address] [place], [place] [id number] Phone: [phone] Fax: [phone]       Devendra King PA-C  Electronically signed by Devendra King PA-C on 11/27/2017 at 4:13 PM     Contact Information:  Nay Dick, [address] [place] [address] Staff  [phone], Option 3    This dictation was performed with a verbal recognition program [facility]) and it was checked for errors. It is possible that there are still dictated errors within this office note. If so, please bring any errors to my attention for an addendum. All efforts were made to ensure that this office note is accurate. I have personally performed and/or participated in the history, physical exam and medical decision making and reviewed all pertinent clinical information unless otherwise noted.

## 2017-11-27 NOTE — PROGRESS NOTES
bodies. Degenerative changes and other findings as above.       The MRI of his left knee dated 11/26/14 shows a chronic anterior cruciate ligament tear. Areas of grade 4 chondromalacia with osteochondral erosion at the lateral trochlear and posterior nonweightbearing lateral femoral condyle. A small oblique tear of the posterior horn and root of the lateral meniscus. Areas of grade 3 and grade 4 chondromalacia at the trochlear.     EMG Report: left lower extremity dated 7/25/17 shows:  IMPRESSION:  1.  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 This neuropathy appears to have primarily axonal loss features with  above noted mild deep peroneal motor conduction block across the left  knee.  Subacute reinnervation appears to be present and muscle  supplied by left deep peroneal branch. 2.  No evidence of a left lumbosacral radiculopathy, diffuse  peripheral neuropathy or other focal mononeuropathy involving the left  lower extremity. Assessment (Medical Decision Making): Ericka Monaco is a 52y.o. year old male with the following diagnosis:    1. Chronic pain of left knee     2. Primary osteoarthritis of left knee     3. Grade 1 injury of medial collateral ligament of left knee     4. Chondromalacia patellae of left knee     5. Numbness and tingling of left leg     6. Peroneal mononeuropathy, left         His overall course:        [x]  Responding adequately to ongoing treatment    []  Worsening despite conservative treatment    [x]  Unchanged despite conservative treatment    Plan (Medical Decision Making):      I discussed the diagnosis and the treatment options with Ericka Monaco today. In Summary:  1).  The various treatment options were outlined and discussed with Ericka Monaco including:      [x] Conservative care options:      physical therapy, ice, NSAIDs, bracing, and activity modification       [x] The questions. Ice (\"ICE IS YOUR FRIEND\": try using a bag of frozen peas or corn) to injured/painful area for 15  20 minutes 3 x day. General Medication Instructions:  Any prescriptions must be used as directed. If you have any concerns, questions or require refills, please contact our office. If you experience any adverse reactions, stop the medication and call our office immediately. If you designate a preferred pharmacy, appropriate prescriptions will be sent to your preferred pharmacy for pickup to be use as directed. Patient Driving Instructions:  No driving if you are taking narcotic pain medications or muscle relaxers. PATIENT REMINDER:   Carry a list of your medications and allergies with you at all times. Call your pharmacy and our office at least 1 week in advance to refill prescriptions. Narcotic medications will not be refilled after hours or on weekends. ATTENTION    As of October 2, 2014, [facility] ([address] [facility]) has mandated that ALL PRESCRIPTION PAIN MEDICINE cannot be called into your pharmacy. This includes:    Oxycodone (Percocet)  Hydrocodone (Vicodin, Norco)  Tramadol (Ultram)  Other    These medications must have prescriptions which are written and signed by your provider. This means that you must call ahead and come in to the office to  the paper prescription and take it to your pharmacy. We are sorry for any inconvenience but this is now the law. Benjie Gowers PA-C  Physician Assistant, Orthopedics    Contact Information:  John Mcqueen,  & Clinical Staff  [phone], Option 3         IMPORTANT NARCOTIC INFORMATION: PLEASE READ     [x] DO NOT share your prescription medication with anyone! Sharing is illegal.  The prescription dose is based on your age, weight, and health problems.   Sharing your narcotic prescription can lead to accidental death of the individual for which the prescription was not prescribed. You may not know about his/her addiction problem. [x] Always use the same pharmacy when filling your narcotic prescriptions. [x] DO NOT mix your narcotic prescription with alcohol. Mixing the narcotic prescription medication with alcohol causes depressive effects including breathing problems, organ malfunction, and cardiac arrest.     [x] Always keep your narcotic medication in a locked, secured location. Keep your medication private. This is to avoid individuals from taking your medication without your knowledge. This medication is highly sought after and locking your prescriptions will protect you from being a target of crime. [x] DO NOT stock pile your medication. This also will protect you from being a target of crime. [x] Dispose of any unused medications properly. Do not flush the medications. Look for appropriate waste collection programs in your community and drug take back events. [x] DO NOT drive while taking narcotic pain medication or muscle relaxers. FOR MORE INFORMATION, CHECK OUT THIS RESOURCE    For your convenience, Dr. Sheba Park has provided the following link that may be helpful for you if you would like more information on your Orthopaedic condition:    www. Orthoinfo. org         If you or someone you know struggles with weight issues and joint pain, please check out this important documentary:      \"Start Moving Start Living\" =  Http://startmovingst[employer]Buck[employer]umeliMarkITx.[employer]       (YOUTUBE video SMSL FULL SD)                VITAMIN D3    Dr Sheba Park recommends that you add an over-the-counter supplement of vitamin D3, (at least 2,000 IU daily). Vitamin D3 is widely available without a prescription at pharmacies and buying clubs ([facility], [facility]) and on-line at sites such as Amazon.com. It comes in a variety of formulations (tablets, gelcaps, liquid) and doses (1,000 IU, 2,000 IU, 4,000 IU, 5,000 IU and even higher).  The right dose for most people is 2,000 IU per day but higher doses are sometimes needed. We have not seen any problems with any of the formulations so we have no reason to recommend a specific brand. You can take your vitamin D3 at any time of the day, with or without food, with or without calcium. Because vitamin D3 is long acting, if you miss your vitamin D3 on one day you can double up the dose on a later day. No orders of the defined types were placed in this encounter. Refills/New Prescriptions:  Orders Placed This Encounter   Medications    naproxen (NAPROSYN) 500 MG tablet     Sig: Take 1 tablet by mouth 2 times daily     Dispense:  60 tablet     Refill:  0    diclofenac sodium (VOLTAREN) 1 % GEL     Sig: Apply 4 g topically 4 times daily as needed for Pain Apply 4 gram 4 times a day     Dispense:  500 g     Refill:  2     Today's prescription medications will be e-scribed (when appropriate) to the Patient's Preferred Pharmacy:   [facility] [address], [address] [facility] 477 [facility]  [address] [place] [address]  Phone: [phone] Fax: [phone] [facility] 1039 [facility], [address] [place] [address]  [address]  Phone: [phone] Fax: [phone]       Amina Chavarria PA-C  Electronically signed by Amina Chavarria PA-C on 11/27/2017 at 4:13 PM     Contact Information:  Aubrie Youngblood, [address] [place] [address] Staff  [phone], Option 3    This dictation was performed with a verbal recognition program ([employer]) and it was checked for errors. It is possible that there are still dictated errors within this office note. If so, please bring any errors to my attention for an addendum. All efforts were made to ensure that this office note is accurate.      I have personally performed and/or participated in the history, physical exam and medical decision making and reviewed all pertinent clinical information unless otherwise noted.

## 2017-12-22 ENCOUNTER — OFFICE VISIT (OUTPATIENT)
Dept: ORTHOPEDIC SURGERY | Age: 47
End: 2017-12-22

## 2017-12-22 VITALS
DIASTOLIC BLOOD PRESSURE: 76 MMHG | WEIGHT: 279.98 LBS | SYSTOLIC BLOOD PRESSURE: 131 MMHG | HEIGHT: 71 IN | BODY MASS INDEX: 39.2 KG/M2 | HEART RATE: 80 BPM

## 2017-12-22 DIAGNOSIS — R20.0 NUMBNESS AND TINGLING OF LEFT LEG: ICD-10-CM

## 2017-12-22 DIAGNOSIS — M25.562 CHRONIC PAIN OF LEFT KNEE: Primary | ICD-10-CM

## 2017-12-22 DIAGNOSIS — G89.29 CHRONIC PAIN OF LEFT KNEE: Primary | ICD-10-CM

## 2017-12-22 DIAGNOSIS — R20.2 NUMBNESS AND TINGLING OF LEFT LEG: ICD-10-CM

## 2017-12-22 DIAGNOSIS — S83.412A GRADE 1 INJURY OF MEDIAL COLLATERAL LIGAMENT OF LEFT KNEE: ICD-10-CM

## 2017-12-22 DIAGNOSIS — M22.42 CHONDROMALACIA PATELLAE OF LEFT KNEE: ICD-10-CM

## 2017-12-22 DIAGNOSIS — M17.12 PRIMARY OSTEOARTHRITIS OF LEFT KNEE: ICD-10-CM

## 2017-12-22 PROCEDURE — 20610 DRAIN/INJ JOINT/BURSA W/O US: CPT | Performed by: ORTHOPAEDIC SURGERY

## 2017-12-22 PROCEDURE — 99999 PR OFFICE/OUTPT VISIT,PROCEDURE ONLY: CPT | Performed by: ORTHOPAEDIC SURGERY

## 2017-12-22 NOTE — PROGRESS NOTES
time.  There are no recent changes. The most recent ROS was scanned into media on 5/25/2017. Objective:   Physical Exam  Vital Signs:  /76   Pulse 80   Ht 5' 10.98\" (1.803 m)   Wt 279 lb 15.8 oz (127 kg)   BMI 39.07 kg/m²     Constitution:  Generally, Sheri Toribio is [x] alert, [x] appears stated age, and [x] in no distress. His general body habitus is [] Cachectic  [] Thin  [] Normal  [x] Obese  [] Morbidly Obese    Head: [x] Normocephalic  Eyes: [x] Extra-occular muscles intact  [x]  Wears glasses  Left Ear: [x] External Ear normal   Right Ear: [x] External Ear normal   Nose: [x] Normal  Mouth: [x] Oral mucosa moist  [x] No perioral lesions    Pulmonary: [x] Respirations unlabored and regular  Skin: [x] Warm [x] Well perfused     Psychiatric:   [x] Good judgement and insight  [x] Oriented to [x] person, [x] place, and [x] time  [x] Mood appropriate for circumstances    Gait:  Gait is [] Normal  [x] Impaired slight limp  Assistive Device: [x] None  [] Knee Brace  [] Cane  [] Crutches   [] Rajesh Dubose   [] Wheelchair  [] Other    ORTHOPAEDIC KNEE EXAM:  []  RIGHT     [x]  LEFT     []  BILATERAL   Inspection:  [x] Skin intact without abrasion or lacerations.   [x] Ecchymosis:  [x] none  [] mild  [] moderate  [] severe  [x] Atrophy:  [x] none  [] mild  [] moderate  [] severe  [x] Effusion: [x] none  [] mild  [] moderate  [] severe  [x] Scar / Surgical incision(s): [x] A-Scope Portals  [] Open Surgical Incision(s)    Alignment:  [x] Normal  [] Varus [] Valgus    Range of Motion:  [] Normal Knee ROM         [] Deferred: acute injury/post-surgery/pain   [x] Limited ROM:     Palpation:   [] No Tenderness  [x] Tenderness: Anterior [x] mild  [] moderate  [] severe   [x] Patellofemoral Crepitation:  [] none  [x] mild  [] moderate  [] severe    Motor Function:   [x] No gross motor weakness  [] Motor weakness:  [] mild  [] moderate  [] severe     Neurologic:  [x] Sensation to light touch intact   [x] Coordination / deep peroneal branch. 2.  No evidence of a left lumbosacral radiculopathy, diffuse  peripheral neuropathy or other focal mononeuropathy involving the left  lower extremity. PROCEDURE NOTE: LEFT KNEE INJECTION  12/22/2017 at 1:02 PM   Procedure: Injection  Verbal consent was obtained. Risks and benefits were explained. Questions were encouraged and answered. Timeout Verification Completed including:    Correct patient: Silvia Mckee was identified    Correct procedure    Correct site & side    Correct equipment and supplies    Staff member: Susan Carr MD     Assistant: Dillon Babcock       Injection Site: Superolateral    The injection site was prepped with Chlora-Prep using aseptic technique and a left knee intra-articular injection was performed with ethyl chloride for anesthetic. Depomedrol 2 ml (40 mg/ml = 80 mg total) was injected. A sterile adhesive dressing was applied. Post procedure: Silvia Mckee tolerated the treatment well. Instructions to patient:  Appropriate post injections instructions were given to Silvia Mckee. Assessment (Medical Decision Making): Silvia Mckee is a 52y.o. year old male with the following diagnosis:    1. Chronic pain of left knee  ND ARTHROCENTESIS ASPIR&/INJ MAJOR JT/BURSA W/O US    ND METHYLPREDNISOLONE 40 MG INJ   2. Primary osteoarthritis of left knee  ND ARTHROCENTESIS ASPIR&/INJ MAJOR JT/BURSA W/O US    ND METHYLPREDNISOLONE 40 MG INJ   3. Grade 1 injury of medial collateral ligament of left knee     4. Chondromalacia patellae of left knee  ND ARTHROCENTESIS ASPIR&/INJ MAJOR JT/BURSA W/O US    ND METHYLPREDNISOLONE 40 MG INJ   5.  Numbness and tingling of left leg         His overall course:       []  Responding adequately to ongoing treatment    []  Worsening despite conservative treatment    [x]  Unchanged despite conservative treatment    Plan (Medical Decision Making):      I discussed the diagnosis and the treatment options with Yahaira Hampton today. In Summary:  1). The various treatment options were outlined and discussed with Yahaira Hampton including:      [x] Conservative care options:      physical therapy, ice, NSAIDs, bracing, and activity modification       [x] The indications for therapeutic injections Steroids and Hyluronic Acid/Synvisc/Euflexxa/Supartz     2). After considering the various options discussed, Yahaira Hampton elected to pursue a course of treatment that includes the following:      [x] MEDICATIONS/REFILLS prescribed today are listed below. No refills today    [x] Physical Therapy/HEP Activities as tolerated       [] Script: 2 x per week x 4 weeks    [x] Ice to affected area: 15-20 minutes 4 x day    [x] Injection into his left knee today    [x]  I have informed Yahaira Hampton that I am planning to retire. I have assured him that appropriate follow-up care will be arranged. My official date for concluding my surgical practice is 12/31/2017, and the official [facility] date is 3 months later to follow my postoperative patients for 90 days on 3/31/2018. He is planning to follow up with Dr. Jai Moulton. Return to Clinic/Follow - Up: Yahaira Hampton was asked to make a follow-up appointment:    [x]  Follow up with Dr. Kelsy Barth was instructed to call the office if his symptoms worsen or if new symptoms appear prior to the next scheduled visit. He is specifically instructed to contact the office between now & his scheduled appointment if he has concerns related to his condition or if he needs assistance in scheduling the above tests. He is welcome to call for an appointment sooner if he has any additional concerns or questions. Patient Education Materials Provided:    Patient Instructions     Yahaira Hampton was instructed to apply ice to the injection area for 15 - 20 minutes several times a day to decrease pain and and swelling.      Ice (\"ICE IS YOUR FRIEND\": try using a office to  the paper prescription and take it to your pharmacy. We are sorry for any inconvenience but this is now the law. Jonny Diaz MD  Board Certified Orthopaedic Surgeon  Knee and Shoulder Surgery Specialist    Contact Information:  Iva Massey,   [phone], Option 3         IMPORTANT NARCOTIC INFORMATION: PLEASE READ     [x] DO NOT share your prescription medication with anyone! Sharing is illegal.  The prescription dose is based on your age, weight, and health problems. Sharing your narcotic prescription can lead to accidental death of the individual for which the prescription was not prescribed. You may not know about his/her addiction problem. [x] Always use the same pharmacy when filling your narcotic prescriptions. [x] DO NOT mix your narcotic prescription with alcohol. Mixing the narcotic prescription medication with alcohol causes depressive effects including breathing problems, organ malfunction, and cardiac arrest.     [x] Always keep your narcotic medication in a locked, secured location. Keep your medication private. This is to avoid individuals from taking your medication without your knowledge. This medication is highly sought after and locking your prescriptions will protect you from being a target of crime. [x] DO NOT stock pile your medication. This also will protect you from being a target of crime. [x] Dispose of any unused medications properly. Do not flush the medications. Look for appropriate waste collection programs in your community and drug take back events. [x] DO NOT drive while taking narcotic pain medication or muscle relaxers. FOR MORE INFORMATION, CHECK OUT THIS RESOURCE    For your convenience, Dr. Randall Toscano has provided the following link that may be helpful for you if you would like more information on your Orthopaedic condition:    www. Orthoinfo. org         If you or someone you know struggles with weight issues and joint pain, please check out this important documentary:      \"Start Moving Start Living\" =  Http://startmovingstartliving.com       (YOUTUBE video SMSL FULL SD)                VITAMIN D3    Dr Olena Johnston recommends that you add an over-the-counter supplement of vitamin D3, (at least 2,000 IU daily). Vitamin D3 is widely available without a prescription at pharmacies and buying clubs ([facility]) and on-line at sites such as Amazon.com. It comes in a variety of formulations (tablets, gelcaps, liquid) and doses (1,000 IU, 2,000 IU, 4,000 IU, 5,000 IU and even higher). The right dose for most people is 2,000 IU per day but higher doses are sometimes needed. We have not seen any problems with any of the formulations so we have no reason to recommend a specific brand. You can take your vitamin D3 at any time of the day, with or without food, with or without calcium. Because vitamin D3 is long acting, if you miss your vitamin D3 on one day you can double up the dose on a later day. Orders Placed This Encounter   Procedures    IA ARTHROCENTESIS ASPIR&/INJ MAJOR JT/BURSA W/O US    IA METHYLPREDNISOLONE 40 MG INJ       Refills/New Prescriptions:  No orders of the defined types were placed in this encounter.     Today's prescription medications will be e-scribed (when appropriate) to the Patient's Preferred Pharmacy:   [facility] [address], [facility]0 [address] [phone]  [address] [place] [address]  Phone: [phone] Fax: [facility] 1039 [facility] [address] [place] [address]  [address] [place] [address] Road  Phone: [phone] Fax: [phone]         Alisia Patel MD  Board Certified Orthopaedic Surgeon  Knee and Shoulder Surgery Specialist  Electronically signed by Alisia Patel MD on 12/22/2017 at 1:02 PM     Contact Information:  Cammy Clifton   [phone], Option 3    This dictation was performed with a verbal recognition program (DRAGON) and it was checked for errors. It is possible that there are still dictated errors within this office note. If so, please bring any errors to my attention for an addendum. All efforts were made to ensure that this office note is accurate. I have personally performed and/or participated in the history, physical exam and medical decision making and reviewed all pertinent clinical information unless otherwise noted.

## 2017-12-22 NOTE — PATIENT INSTRUCTIONS
Lauren Gutierrez was instructed to apply ice to the injection area for 15 - 20 minutes several times a day to decrease pain and and swelling. Ice (\"ICE IS YOUR FRIEND\": try using a bag of frozen peas or corn) for 15  20 minutes 3 x day. Limit activities today. You may resume normal activities tomorrow if you have no pain. For severe pain:  If after hours, he is to go to Emergency Room. During office hours he must come in to the office. Lauren Gutierrez was instructed to call the office if there are any questions or concerns related to his condition. I have asked him to schedule a follow-up appointment for 6-8 weeks from now for re-evaluation and possible repeat injection. He is specifically instructed to contact the office between now & his scheduled appointment if he has concerns related to his condition. He is welcome to call for an appointment sooner if he has any additional concerns or questions. General Medication Instructions:  Any prescriptions must be used as directed. If you have any concerns, questions or require refills, please contact our office. If you experience any adverse reactions, stop the medication and call our office immediately. If you designate a preferred pharmacy, appropriate prescriptions will be sent to your preferred pharmacy for pickup to be use as directed. Patient Driving Instructions:  No driving if you are taking narcotic pain medications or muscle relaxers. PATIENT REMINDER:   Carry a list of your medications and allergies with you at all times. Call your pharmacy and our office at least 1 week in advance to refill prescriptions. Narcotic medications will not be refilled after hours or on weekends. ATTENTION    As of October 2, 2014, the [facility] ([facility]) has mandated that ALL PRESCRIPTION PAIN MEDICINE cannot be called into your pharmacy.     This includes:    Oxycodone (Percocet)  Hydrocodone (Vicodin, provided the following link that may be helpful for you if you would like more information on your Orthopaedic condition:    www. Orthoinfo. org         If you or someone you know struggles with weight issues and joint pain, please check out this important documentary:      \"Start Moving Start Living\" =  Http://startmovingstartliving.TV Pixie       (YOUTUBE video SMSL FULL SD)                VITAMIN D3    Dr Clem Cline recommends that you add an over-the-counter supplement of vitamin D3, (at least 2,000 IU daily). Vitamin D3 is widely available without a prescription at pharmacies and buying clubs ([facility], [facility]) and on-line at sites such as Amazon.com. It comes in a variety of formulations (tablets, gelcaps, liquid) and doses (1,000 IU, 2,000 IU, 4,000 IU, 5,000 IU and even higher). The right dose for most people is 2,000 IU per day but higher doses are sometimes needed. We have not seen any problems with any of the formulations so we have no reason to recommend a specific brand. You can take your vitamin D3 at any time of the day, with or without food, with or without calcium. Because vitamin D3 is long acting, if you miss your vitamin D3 on one day you can double up the dose on a later day.

## 2017-12-22 NOTE — LETTER
FAXED/SENT TO Dr Escobedo primary care provider on file. 12/22/17, 1:07 PM        Henrik Jennings MD  Orthopaedic Surgery & Sports Medicine  Knee & Shoulder Specialist      Dear Dr Escobedo primary care provider on file.,    Thank you very much for your referral of Mr. Lopez Lo to me for evaluation and treatment. Attached below is my report and recommendations from Lex Sanchez most recent office visit. I appreciate your confidence in me and thank you for allowing me the opportunity to care for your patients. If I can be of any further assistance to you on this or any other patient, please do not hesitate to contact me. Sincerely,    Henrik Jennings MD  Board Certified Orthopaedic Surgeon  Knee and Shoulder Surgery Specialist  Electronically signed by Henrik Jennings MD on 12/22/2017 at 1:07 PM     Contact Information:  Reyna Colunga,   [phone], Option 3                             I have personally performed and/or participated in the history, physical exam and medical decision making and reviewed all pertinent clinical information unless otherwise noted.             Henrik Jennings MD  Orthopaedic Surgery & Sports Medicine  Knee & Shoulder Specialist       [address] [place] [address]    [phone] Option 3   [phone] Option 3  [phone] (fax)    [phone] (fax)       PATIENT: Lopez Lo    52 y.o.  male  [facility]: 1970   MRN:  [id number]       Date of current encounter: 12/22/2017  This encounter is evaluated as a:        New Patient Visit     Established Patient Visit    Post-Op Visit      Consult: requested by          Worker's Comp       Patient's PCP is Dr. Perla Juarez primary care provider on file.      SURGICAL FINDINGS: S/P left knee arthroscopy, chondroplasty, synovectomy, partial medial and lateral meniscectomy 12/17/14      Subjective:     Chief Complaint   Patient presents with    Knee Pain     ongoing evaluation and treatment of left knee        HPI:  Sheri Toribio is a 52y.o. year old,  male complaining of left knee pain. He states that he has an appointment with Dr. Avi Cam. He would like an injection in his left knee today. PAIN ASSESSMENT:   Pain Assessment  Location of Pain: Knee  Location Modifiers: Left  Severity of Pain: 10  Quality of Pain: Throbbing, Sharp, Aching, Popping  Duration of Pain: A few days  Frequency of Pain: Constant  Date Pain First Started: 05/08/17  Aggravating Factors: Bending, Stretching, Exercise, Kneeling, Squatting, Standing, Walking, Stairs  Limiting Behavior: Yes  Relieving Factors: Rest, Ice, Other (Comment) (tylenol)  Result of Injury: Yes  Work-Related Injury: Yes  Are there other pain locations you wish to document?: No    Patient Active Problem List   Diagnosis    Chondromalacia patellae of left knee    Chronic Anterior cruciate ligament tear left knee    S/P left knee arthroscopy, chondroplasty, synovectomy, partial medial and lateral meniscectomy 12/17/14    Primary osteoarthritis of left knee    Numbness and tingling of left leg    Acute pain of left knee    Rupture of anterior cruciate ligament of left knee    Grade 1 injury of medial collateral ligament of left knee    Peroneal mononeuropathy, left    Chronic pain of left knee     Past Medical History:   Diagnosis Date    Arthritis     left knee     Past Surgical History:   Procedure Laterality Date    KNEE ARTHROSCOPY Right 2006    arthroscopy    KNEE SURGERY  12/17/2014    LEFT KNEE ARTHROSCOPY, CHONDROPLASTY, SYNOVECTOMY, PARTIAL       Allergies:  Review of patient's allergies indicates no known allergies. Medications:  Prior to Visit Medications    Medication Sig Taking?  Authorizing Provider   naproxen (NAPROSYN) 500 MG tablet Take 1 tablet by mouth 2 times daily  DAYANNA Oswald diclofenac sodium (VOLTAREN) 1 % GEL Apply 4 g topically 4 times daily as needed for Pain Apply 4 gram 4 times a day  DAYANNA Dela Cruz     Social History     Social History    Marital status: Single     Spouse name: N/A    Number of children: N/A    Years of education: N/A     Occupational History    Not on file. Social History Main Topics    Smoking status: Current Every Day Smoker     Packs/day: 1.00     Years: 30.00    Smokeless tobacco: Current User    Alcohol use 7.2 oz/week     12 Cans of beer per week      Comment: NONE FOR PAST MONTH    Drug use: No    Sexual activity: Not on file     Other Topics Concern    Not on file     Social History Narrative    No narrative on file     No family history on file. Review of Systems (ROS):    Performed. Lisy Hatfield's review of systems has been performed (Musculoskeletal, Integumentary, CardioPulmonary, Neurological focused) by intake and observation. All past and current ROS forms have been scanned into the medical record. He has been instructed to contact his primary care physician regarding ROS issues if not already being addressed at this time. There are no recent changes. The most recent ROS was scanned into media on 5/25/2017. Objective:   Physical Exam  Vital Signs:  /76   Pulse 80   Ht 5' 10.98\" (1.803 m)   Wt 279 lb 15.8 oz (127 kg)   BMI 39.07 kg/m²      Constitution:  Generally, Yahaira Hampton is  alert,  appears stated age, and  in no distress.   His general body habitus is  Cachectic   Thin   Normal   Obese   Morbidly Obese    Head:  Normocephalic  Eyes:  Extra-occular muscles intact    Wears glasses  Left Ear:  External Ear normal   Right Ear:  External Ear normal   Nose:  Normal  Mouth:  Oral mucosa moist   No perioral lesions    Pulmonary:  Respirations unlabored and regular  Skin:  Warm  Well perfused     Psychiatric:    Good judgement and insight   Oriented to  person,  place, and  time Mood appropriate for circumstances    Gait:  Gait is  Normal   Impaired slight limp  Assistive Device:  None   Knee Brace   Cane   Crutches    Walker    Wheelchair   Other    ORTHOPAEDIC KNEE EXAM:    RIGHT       LEFT       BILATERAL   Inspection:   Skin intact without abrasion or lacerations. Ecchymosis:   none   mild   moderate   severe   Atrophy:   none   mild   moderate   severe   Effusion:  none   mild   moderate   severe   Scar / Surgical incision(s): A-Scope Portals   Open Surgical Incision(s)    Alignment:   Normal   Varus  Valgus    Range of Motion:   Normal Knee ROM          Deferred: acute injury/post-surgery/pain    Limited ROM:     Palpation:    No Tenderness   Tenderness: Anterior  mild   moderate   severe    Patellofemoral Crepitation:   none   mild   moderate   severe    Motor Function:    No gross motor weakness   Motor weakness:   mild   moderate   severe     Neurologic:   Sensation to light touch intact    Coordination / proprioception intact    Circulation:   The limb is warm and well perfused   Capillary refill is intact. Edema   none   mild   moderate   severe   Venous stasis changes   none   mild   moderate   severe    Contralateral Knee:   No pain with ROM      Data Reviewed:     No imaging studies were obtained today. XRays:  (3 views: AP, lateral and patella views) of his left knee taken on 5/25/17 showed moderate degenerative changes in the patellofemoral compartment, moderate-severe degenerative changes in the medial compartment and severe degenerative changes in the lateral compartment.  There is joint space narrowing and osteophyte formation in all 3 compartments.  There is neutral alignment.        MRI Results: The MRI of his left knee dated 6/6/17 shows:  ACL tear. Medial meniscal tear as above. Abnormal lateral meniscus as above, most likely postoperative, although superimposed acute tear is not completely excluded. Moderate sized knee joint effusion with numerous small filling defects in the fluid suggestive of intra-articular bodies. Degenerative changes and other findings as above.       The MRI of his left knee dated 11/26/14 shows a chronic anterior cruciate ligament tear. Areas of grade 4 chondromalacia with osteochondral erosion at the lateral trochlear and posterior nonweightbearing lateral femoral condyle. A small oblique tear of the posterior horn and root of the lateral meniscus. Areas of grade 3 and grade 4 chondromalacia at the trochlear.     EMG Report: left lower extremity dated 7/25/17 shows:  IMPRESSION:  1.  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 This neuropathy appears to have primarily axonal loss features with  above noted mild deep peroneal motor conduction block across the left  knee.  Subacute reinnervation appears to be present and muscle  supplied by left deep peroneal branch. 2.  No evidence of a left lumbosacral radiculopathy, diffuse  peripheral neuropathy or other focal mononeuropathy involving the left  lower extremity. PROCEDURE NOTE: LEFT KNEE INJECTION  12/22/2017 at 1:02 PM   Procedure: Injection  Verbal consent was obtained. Risks and benefits were explained. Questions were encouraged and answered. Timeout Verification Completed including:    Correct patient: Surekha Garnica was identified    Correct procedure    Correct site & side    Correct equipment and supplies    Staff member: Charis Olvera MD     Assistant: Regan Koenig       Injection Site: Superolateral    The injection site was prepped with Chlora-Prep using aseptic technique and a left knee intra-articular injection was performed with ethyl chloride for anesthetic. Depomedrol 2 ml (40 mg/ml = 80 mg total) was injected. A sterile adhesive dressing was applied. date for concluding my surgical practice is 12/31/2017, and the official senior living date is 3 months later to follow my postoperative patients for 90 days on 3/31/2018. He is planning to follow up with Dr. Nicole Astudillo. Return to Clinic/Follow - Up: Surkeha Garnica was asked to make a follow-up appointment:      Follow up with Dr. Conchita Avendaño was instructed to call the office if his symptoms worsen or if new symptoms appear prior to the next scheduled visit. He is specifically instructed to contact the office between now & his scheduled appointment if he has concerns related to his condition or if he needs assistance in scheduling the above tests. He is welcome to call for an appointment sooner if he has any additional concerns or questions. Patient Education Materials Provided:    Patient Instructions     Surekha Garnica was instructed to apply ice to the injection area for 15 - 20 minutes several times a day to decrease pain and and swelling. Ice (\"ICE IS YOUR FRIEND\": try using a bag of frozen peas or corn) for 15  20 minutes 3 x day. Limit activities today. You may resume normal activities tomorrow if you have no pain. For severe pain:  If after hours, he is to go to Emergency Room. During office hours he must come in to the office. Surekha Garnica was instructed to call the office if there are any questions or concerns related to his condition. I have asked him to schedule a follow-up appointment for 6-8 weeks from now for re-evaluation and possible repeat injection. He is specifically instructed to contact the office between now & his scheduled appointment if he has concerns related to his condition. He is welcome to call for an appointment sooner if he has any additional concerns or questions. General Medication Instructions:  Any prescriptions must be used as directed.   If you have any concerns,